# Patient Record
Sex: MALE | Race: OTHER | URBAN - METROPOLITAN AREA
[De-identification: names, ages, dates, MRNs, and addresses within clinical notes are randomized per-mention and may not be internally consistent; named-entity substitution may affect disease eponyms.]

---

## 2022-03-09 ENCOUNTER — OFFICE VISIT (OUTPATIENT)
Dept: PRIMARY CARE CLINIC | Age: 22
End: 2022-03-09
Payer: MEDICAID

## 2022-03-09 VITALS
HEART RATE: 93 BPM | DIASTOLIC BLOOD PRESSURE: 78 MMHG | OXYGEN SATURATION: 98 % | BODY MASS INDEX: 25.05 KG/M2 | WEIGHT: 175 LBS | SYSTOLIC BLOOD PRESSURE: 110 MMHG | TEMPERATURE: 97.9 F | RESPIRATION RATE: 20 BRPM | HEIGHT: 70 IN

## 2022-03-09 DIAGNOSIS — I77.6 AORTITIS (HCC): Primary | ICD-10-CM

## 2022-03-09 DIAGNOSIS — F32.A DEPRESSION, UNSPECIFIED DEPRESSION TYPE: ICD-10-CM

## 2022-03-09 PROCEDURE — 4004F PT TOBACCO SCREEN RCVD TLK: CPT | Performed by: FAMILY MEDICINE

## 2022-03-09 PROCEDURE — G8427 DOCREV CUR MEDS BY ELIG CLIN: HCPCS | Performed by: FAMILY MEDICINE

## 2022-03-09 PROCEDURE — G8484 FLU IMMUNIZE NO ADMIN: HCPCS | Performed by: FAMILY MEDICINE

## 2022-03-09 PROCEDURE — 99203 OFFICE O/P NEW LOW 30 MIN: CPT | Performed by: FAMILY MEDICINE

## 2022-03-09 PROCEDURE — G8419 CALC BMI OUT NRM PARAM NOF/U: HCPCS | Performed by: FAMILY MEDICINE

## 2022-03-09 SDOH — ECONOMIC STABILITY: FOOD INSECURITY: WITHIN THE PAST 12 MONTHS, YOU WORRIED THAT YOUR FOOD WOULD RUN OUT BEFORE YOU GOT MONEY TO BUY MORE.: OFTEN TRUE

## 2022-03-09 SDOH — ECONOMIC STABILITY: FOOD INSECURITY: WITHIN THE PAST 12 MONTHS, THE FOOD YOU BOUGHT JUST DIDN'T LAST AND YOU DIDN'T HAVE MONEY TO GET MORE.: OFTEN TRUE

## 2022-03-09 ASSESSMENT — ENCOUNTER SYMPTOMS
VOMITING: 0
DIARRHEA: 0
ABDOMINAL PAIN: 0
CONSTIPATION: 0
SORE THROAT: 0
COUGH: 0
BLOOD IN STOOL: 0
NAUSEA: 0
SHORTNESS OF BREATH: 0

## 2022-03-09 ASSESSMENT — PATIENT HEALTH QUESTIONNAIRE - PHQ9
1. LITTLE INTEREST OR PLEASURE IN DOING THINGS: 0
SUM OF ALL RESPONSES TO PHQ QUESTIONS 1-9: 0
SUM OF ALL RESPONSES TO PHQ QUESTIONS 1-9: 0
2. FEELING DOWN, DEPRESSED OR HOPELESS: 0
SUM OF ALL RESPONSES TO PHQ QUESTIONS 1-9: 0
SUM OF ALL RESPONSES TO PHQ9 QUESTIONS 1 & 2: 0
SUM OF ALL RESPONSES TO PHQ QUESTIONS 1-9: 0

## 2022-03-09 ASSESSMENT — SOCIAL DETERMINANTS OF HEALTH (SDOH): HOW HARD IS IT FOR YOU TO PAY FOR THE VERY BASICS LIKE FOOD, HOUSING, MEDICAL CARE, AND HEATING?: SOMEWHAT HARD

## 2022-03-09 NOTE — LETTER
[address] [facility]  [address] [place] [id number]  Phone: [phone]  Fax: [phone]    Kelley Lemus MD        March 9, 2022     Patient: Pj Michel   YOB: 2000   Date of Visit: 3/9/2022       To Whom it May Concern:    Pj Michel was seen in my clinic on 3/9/2022. He may return to work immediately, with no restrictions. If you have any questions or concerns, please don't hesitate to call.     Sincerely,         Teresa Mathew, Asael Blackwell MD     DM/lh

## 2022-03-09 NOTE — PROGRESS NOTES
Chief Complaint   Patient presents with    New Patient     NP to establish    Other     needs clearance note to return to work         Joann Yung is a 24 y.o. male who presents to be established. Pt works in [employer] and has been a temp for 3 months but needs a note that he is mentally fit so that he can be hired permanently     Patient has a past medical history significant for ADHD and depression and used to be on medication but stopped taking it 3 years ago as his mood improved    Of note pt was admitted for abdominal pain back in 06/2020 and had a CT scan abdomen/pelvis done which showed moderate stool and pt was Rx miralax with resolution.  Of note a lesion was noted in his suprarenal abdominal aorta which was felt to be an intramural hematoma and pt was seen by Vascular surgery who recommended no surgical intervention    Pt denies any hx of diabetes, asthma or HTN    Positive occasional tobacco and alcohol use and pt denies any illegal drug use    FHx negative for diabetes, CAD or aortic aneurysms; FHx positive for HTN (father)        CT ANGIOGRAM 06/14/2020: Jef Cervantes is a crescentic low-density focus along the anterior left side of the suprarenal abdominal aorta at the level of the diaphragm that measures 5 mm in thickness, which is unchanged compared to the earlier CT.  This extends along the left side of the origin of the celiac artery, where it measures 7 mm in thickness, also unchanged.  There is mild compression on the anterior aspect of the aorta, but no significant compression on the celiac artery.  The celiac artery is widely patent with no stenosis.  No other focal mural thickening or dissection flap is identified in the thoracic aorta or abdominal aorta.  The thoracic aorta and abdominal aorta are normal in caliber.  The ascending thoracic aorta measures 2.4 cm in width, the descending thoracic aorta measures 2.1 cm, the suprarenal abdominal aorta measures 1.7 cm, the juxtarenal abdominal aorta measures 1.4 cm, and the distal abdominal aorta measures 1.2 cm.  The superior mesenteric artery, inferior mesenteric artery, and single main renal arteries are normal in caliber and widely patent.  The common iliac arteries and branch vessels in the pelvis and proximal lower extremities are also normal in caliber with no mural thickening or stenosis.  No atherosclerotic changes or other luminal irregularities are seen within the thoracic aorta or its branch vessels. CT CHEST:  The heart is normal in size.  There is no pericardial effusion, pleural effusion, or pneumothorax.  Normal thymic tissue is noted in the anterior mediastinum.  No lymphadenopathy is identified.  The lungs are well expanded and clear.  Central airways are normal in caliber. CT ABDOMEN:  No significant abnormality is identified in the liver, spleen, pancreas, adrenal glands, or kidneys.  A moderate amount of retained stool is again noted in the colon.  No bowel wall thickening, pneumatosis intestinalis, or evidence of bowel ischemia.  No ascites, hematoma, or free intraperitoneal gas.  There are a few top-normal sized retroperitoneal lymph nodes, likely reactive. CT PELVIS:  The bladder is opacified with contrast and grossly normal in appearance.  A small amount of free fluid is again noted in the pelvis.  No mass or lymphadenopathy is identified.  The appendix is clearly seen and there is no evidence of appendicitis. No fracture or suspicious osseous lesion is identified. Review of Systems   Constitutional: Negative for fatigue and fever. HENT: Negative for nosebleeds and sore throat. Eyes:        Positive blurred vision but pt lost his glasses; Negative diplopia   Respiratory: Negative for cough and shortness of breath. Cardiovascular: Negative for chest pain, palpitations and leg swelling.    Gastrointestinal: Negative for abdominal pain, blood in stool, constipation, diarrhea, nausea and Eric Glover MD, Vascular Surgery, [facility]    2.  Depression, unspecified depression type  Pt clinically stable off of medications and will give patient a note stating that he is able to work w/o any restrictions      Chava De La Rosa MD      Return in about 2 months (around 5/9/2022) for physical.